# Patient Record
Sex: FEMALE | Race: WHITE | NOT HISPANIC OR LATINO | URBAN - METROPOLITAN AREA
[De-identification: names, ages, dates, MRNs, and addresses within clinical notes are randomized per-mention and may not be internally consistent; named-entity substitution may affect disease eponyms.]

---

## 2017-10-04 ENCOUNTER — IMPORTED ENCOUNTER (OUTPATIENT)
Dept: URBAN - METROPOLITAN AREA CLINIC 31 | Facility: CLINIC | Age: 22
End: 2017-10-04

## 2017-10-04 PROCEDURE — 92310 CONTACT LENS FITTING OU: CPT

## 2017-10-04 PROCEDURE — 92015 DETERMINE REFRACTIVE STATE: CPT

## 2017-10-04 PROCEDURE — 92014 COMPRE OPH EXAM EST PT 1/>: CPT

## 2017-10-04 NOTE — PATIENT DISCUSSION
Refractive error Glasses change optional. Continue present contact lens modality. Stop/wear and call if any redness pain or decrease in vision occur.

## 2019-02-14 ENCOUNTER — IMPORTED ENCOUNTER (OUTPATIENT)
Dept: URBAN - METROPOLITAN AREA CLINIC 31 | Facility: CLINIC | Age: 24
End: 2019-02-14

## 2019-02-14 PROCEDURE — 92014 COMPRE OPH EXAM EST PT 1/>: CPT

## 2019-02-14 PROCEDURE — 92015 DETERMINE REFRACTIVE STATE: CPT

## 2019-02-14 PROCEDURE — 92310 CONTACT LENS FITTING OU: CPT

## 2019-02-14 PROCEDURE — V2520 CONTACT LENS HYDROPHILIC: HCPCS

## 2020-02-20 ENCOUNTER — IMPORTED ENCOUNTER (OUTPATIENT)
Dept: URBAN - METROPOLITAN AREA CLINIC 31 | Facility: CLINIC | Age: 25
End: 2020-02-20

## 2020-02-20 PROCEDURE — 92310 CONTACT LENS FITTING OU: CPT

## 2020-02-20 PROCEDURE — 92014 COMPRE OPH EXAM EST PT 1/>: CPT

## 2020-02-20 PROCEDURE — V2520 CONTACT LENS HYDROPHILIC: HCPCS

## 2021-09-07 NOTE — PROCEDURE NOTE: CLINICAL
PROCEDURE NOTE: SLT #2 OU. Diagnosis: POAG, Mild. Anesthesia: Topical. Prep: Alphagan 0.15%. Prior to treatment, risks/benefits/alternatives discussed including infection, loss of vision, hemorrhage, cataract, glaucoma, retinal tears or detachment. Lens:  SLT laser lens with goniosol. Power: 1.2mJ. Total applications: 039/093. Application 121 degrees. Patient tolerated procedure well. There were no complications. Post-op instructions given. Post-op IOP = 14/18 mmHg. Scout Leslie

## 2022-04-02 ASSESSMENT — VISUAL ACUITY
OD_CC: J1+14''
OS_CC: J1+14''
OD_SC: 20/25
OS_SC: 20/25

## 2022-04-02 ASSESSMENT — TONOMETRY
OD_IOP_MMHG: 20
OS_IOP_MMHG: 20
OS_IOP_MMHG: 18
OD_IOP_MMHG: 20
OS_IOP_MMHG: 20
OD_IOP_MMHG: 20